# Patient Record
Sex: FEMALE | Race: WHITE | NOT HISPANIC OR LATINO | URBAN - METROPOLITAN AREA
[De-identification: names, ages, dates, MRNs, and addresses within clinical notes are randomized per-mention and may not be internally consistent; named-entity substitution may affect disease eponyms.]

---

## 2020-11-13 LAB — GLUCOSE SERPL-MCNC: 78 MG/DL (ref 70–110)

## 2021-10-21 ENCOUNTER — TELEPHONE (OUTPATIENT)
Dept: HEMATOLOGY/ONCOLOGY | Facility: CLINIC | Age: 31
End: 2021-10-21

## 2021-10-21 DIAGNOSIS — Z52.001 STEM CELL DONOR: Primary | ICD-10-CM

## 2021-11-04 ENCOUNTER — LAB VISIT (OUTPATIENT)
Dept: LAB | Facility: HOSPITAL | Age: 31
End: 2021-11-04
Payer: COMMERCIAL

## 2021-11-04 DIAGNOSIS — Z52.001 STEM CELL DONOR: ICD-10-CM

## 2021-11-10 PROCEDURE — 81373 HLA I TYPING 1 LOCUS LR: CPT | Mod: 59,PO | Performed by: INTERNAL MEDICINE

## 2021-11-10 PROCEDURE — 81376 HLA II TYPING 1 LOCUS LR: CPT | Mod: 59,PO | Performed by: INTERNAL MEDICINE

## 2021-11-10 PROCEDURE — 81373 HLA I TYPING 1 LOCUS LR: CPT | Mod: PO | Performed by: INTERNAL MEDICINE

## 2021-11-10 PROCEDURE — 81376 HLA II TYPING 1 LOCUS LR: CPT | Mod: PO | Performed by: INTERNAL MEDICINE

## 2021-11-12 LAB — HLATY INTERPRETATION: NORMAL

## 2021-11-17 ENCOUNTER — TELEPHONE (OUTPATIENT)
Dept: HEMATOLOGY/ONCOLOGY | Facility: CLINIC | Age: 31
End: 2021-11-17
Payer: COMMERCIAL

## 2021-11-22 LAB
HLA DQA1 1: NORMAL
HLA DQA1 2: NORMAL
HLA DRB4 1: NORMAL
HLA REALTIMEPCR TYPING CLASSI&II INTERPRETATION: NORMAL
HLA-A 1 SERO. EQUIV: 1
HLA-A 1: NORMAL
HLA-A 2 SERO. EQUIV: 23
HLA-A 2: NORMAL
HLA-B 1 SERO. EQUIV: 61
HLA-B 1: NORMAL
HLA-B 2 SERO. EQUIV: 51
HLA-B 2: NORMAL
HLA-BW 1 SERO. EQUIV: 4
HLA-BW 2 SERO. EQUIV: 6
HLA-C 1: NORMAL
HLA-C 2: NORMAL
HLA-CW 1 SERO. EQUIV: 2
HLA-CW 2 SERO. EQUIV: 14
HLA-DPA1 1: NORMAL
HLA-DPA1 2: NORMAL
HLA-DPB1 1: NORMAL
HLA-DPB1 2: NORMAL
HLA-DQ 1 SERO. EQUIV: 7
HLA-DQ 2 SERO. EQUIV: NORMAL
HLA-DQB1 1: NORMAL
HLA-DQB1 2: NORMAL
HLA-DRB1 1 SERO. EQUIV: 11
HLA-DRB1 1: NORMAL
HLA-DRB1 2 SERO. EQUIV: NORMAL
HLA-DRB1 2: NORMAL
HLA-DRB3 1: NORMAL
HLA-DRB3 2: NORMAL
HLA-DRB345 1 SERO. EQUIV: 52
HLA-DRB345 2 SERO. EQUIV: NORMAL
HLA-DRB4 2: NORMAL
HLA-DRB5 1: NORMAL
HLA-DRB5 2: NORMAL
RTPCR TESTING DATE: NORMAL

## 2022-09-15 ENCOUNTER — HISTORICAL (OUTPATIENT)
Dept: ADMINISTRATIVE | Facility: HOSPITAL | Age: 32
End: 2022-09-15
Payer: COMMERCIAL

## 2024-01-06 ENCOUNTER — OFFICE VISIT (OUTPATIENT)
Dept: URGENT CARE | Facility: CLINIC | Age: 34
End: 2024-01-06
Payer: COMMERCIAL

## 2024-01-06 VITALS
BODY MASS INDEX: 30.82 KG/M2 | SYSTOLIC BLOOD PRESSURE: 134 MMHG | WEIGHT: 185 LBS | OXYGEN SATURATION: 97 % | DIASTOLIC BLOOD PRESSURE: 91 MMHG | HEIGHT: 65 IN | TEMPERATURE: 98 F | HEART RATE: 77 BPM | RESPIRATION RATE: 20 BRPM

## 2024-01-06 DIAGNOSIS — L02.91 ABSCESS: Primary | ICD-10-CM

## 2024-01-06 PROCEDURE — 99203 OFFICE O/P NEW LOW 30 MIN: CPT | Mod: ,,, | Performed by: PHYSICIAN ASSISTANT

## 2024-01-06 RX ORDER — VORTIOXETINE 20 MG/1
1 TABLET, FILM COATED ORAL
COMMUNITY

## 2024-01-06 RX ORDER — LISINOPRIL 20 MG/1
20 TABLET ORAL
COMMUNITY

## 2024-01-06 RX ORDER — GENTAMICIN SULFATE 1 MG/G
OINTMENT TOPICAL 3 TIMES DAILY
Qty: 15 G | Refills: 0 | Status: SHIPPED | OUTPATIENT
Start: 2024-01-06

## 2024-01-06 RX ORDER — DROSPIRENONE AND ETHINYL ESTRADIOL 0.02-3(28)
1 KIT ORAL
COMMUNITY
Start: 2023-11-20

## 2024-01-06 RX ORDER — SULFAMETHOXAZOLE AND TRIMETHOPRIM 800; 160 MG/1; MG/1
1 TABLET ORAL 2 TIMES DAILY
Qty: 20 TABLET | Refills: 0 | Status: SHIPPED | OUTPATIENT
Start: 2024-01-06 | End: 2024-01-16

## 2024-01-06 NOTE — PATIENT INSTRUCTIONS
Recommend Bactrim antibiotic coverage for infection concern.  Recommend gentamicin antibiotic ointment application with warm compresses and gentle massage.  Alternate Tylenol and ibuprofen every 4-6 hours if needed for aches pains fever or chills.  Continue monitoring swelling and recommend close follow-up with primary care physician 3-5 days for re-evaluation wound check.  Recommended emergency department evaluation sooner if facial swelling worsens.

## 2024-01-06 NOTE — PROGRESS NOTES
"Subjective:      Patient ID: Court Boyd is a 33 y.o. female.    Vitals:  height is 5' 5" (1.651 m) and weight is 83.9 kg (185 lb). Her temperature is 98 °F (36.7 °C). Her blood pressure is 134/91 (abnormal) and her pulse is 77. Her respiration is 20 and oxygen saturation is 97%.     Chief Complaint: Eye Problem (Pt starting having concerns with right eye on Thursday. With concerns of swelling, redness, and pain.)    HPI  female reports small pustule to right medial nose over the last 48 hours with increasing swelling no active drainage presents to urgent Care for initial evaluation.  Patient reports no visual problems and no contact lens use.   Eye Problem     Additional comments: Pt starting having concerns with right eye on   Thursday. With concerns of swelling, redness, and pain.      Eye Problem   This is a new problem. The problem has been gradually worsening. Pertinent negatives include no eye discharge, eye redness, fever or itching.       Constitution: Negative for chills, fatigue and fever.   HENT:  Negative for sinus pain and sinus pressure.    Eyes:  Positive for eyelid swelling. Negative for eye trauma, foreign body in eye, eye discharge, eye itching and eye redness.   Gastrointestinal: Negative.    Skin:  Positive for erythema.   Allergic/Immunologic: Negative.    Neurological:  Negative for headaches.      Objective:     Physical Exam   Constitutional: She is oriented to person, place, and time. She appears well-developed. She is cooperative.  Non-toxic appearance. She does not appear ill. No distress.      Comments:Awake alert ambulatory female     HENT:   Head: Normocephalic.       Ears:   Right Ear: Hearing and external ear normal.   Left Ear: Hearing and external ear normal.   Nose: Nose normal. No mucosal edema, rhinorrhea, nasal deformity or congestion. No epistaxis. Right sinus exhibits no maxillary sinus tenderness and no frontal sinus tenderness. Left sinus exhibits no maxillary sinus " tenderness and no frontal sinus tenderness.   Mouth/Throat: Uvula is midline, oropharynx is clear and moist and mucous membranes are normal. Mucous membranes are moist. No trismus in the jaw. Normal dentition. No uvula swelling. No posterior oropharyngeal edema.   Eyes: Conjunctivae and lids are normal. Pupils are equal, round, and reactive to light. Right eye exhibits no discharge, no exudate and no hordeolum. No foreign body present in the right eye. Left eye exhibits no discharge, no exudate and no hordeolum. No foreign body present in the left eye. Right conjunctiva is not injected. Left conjunctiva is not injected. No scleral icterus. Extraocular movement intact vision grossly intact   Neck: Trachea normal and phonation normal. Neck supple. No edema present. No erythema present.   Cardiovascular: Normal rate and normal pulses.   Pulmonary/Chest: Effort normal. She has no decreased breath sounds.   Abdominal: Normal appearance.   Musculoskeletal: Normal range of motion.         General: No deformity. Normal range of motion.   Neurological: no focal deficit. She is alert and oriented to person, place, and time. She displays no weakness. No cranial nerve deficit. She exhibits normal muscle tone.   Skin: Skin is warm, dry, intact, not diaphoretic and not pale. erythema   Psychiatric: Her speech is normal.   Nursing note and vitals reviewed.      Assessment:     1. Abscess        Plan:   Recommend Bactrim antibiotic coverage for infection concern.  Recommend gentamicin antibiotic ointment application with warm compresses and gentle massage.  Alternate Tylenol and ibuprofen every 4-6 hours if needed for aches pains fever or chills.  Continue monitoring swelling and recommend close follow-up with primary care physician 3-5 days for re-evaluation wound check.  Recommended emergency department evaluation sooner if facial swelling worsens.    Abscess    Other orders  -     sulfamethoxazole-trimethoprim 800-160mg (BACTRIM  DS) 800-160 mg Tab; Take 1 tablet by mouth 2 (two) times daily. for 10 days  Dispense: 20 tablet; Refill: 0  -     gentamicin (GARAMYCIN) 0.1 % ointment; Apply topically 3 (three) times daily.  Dispense: 15 g; Refill: 0

## 2024-05-14 ENCOUNTER — OFFICE VISIT (OUTPATIENT)
Dept: URGENT CARE | Facility: CLINIC | Age: 34
End: 2024-05-14
Payer: COMMERCIAL

## 2024-05-14 VITALS
OXYGEN SATURATION: 97 % | SYSTOLIC BLOOD PRESSURE: 126 MMHG | HEIGHT: 65 IN | TEMPERATURE: 98 F | DIASTOLIC BLOOD PRESSURE: 87 MMHG | RESPIRATION RATE: 20 BRPM | WEIGHT: 185 LBS | HEART RATE: 82 BPM | BODY MASS INDEX: 30.82 KG/M2

## 2024-05-14 DIAGNOSIS — M79.671 RIGHT FOOT PAIN: Primary | ICD-10-CM

## 2024-05-14 PROCEDURE — 99212 OFFICE O/P EST SF 10 MIN: CPT | Mod: ,,, | Performed by: PHYSICIAN ASSISTANT

## 2024-05-14 RX ORDER — BUSPIRONE HYDROCHLORIDE 10 MG/1
TABLET ORAL
COMMUNITY

## 2024-05-14 RX ORDER — LISINOPRIL AND HYDROCHLOROTHIAZIDE 12.5; 2 MG/1; MG/1
1 TABLET ORAL
COMMUNITY

## 2024-05-14 NOTE — PATIENT INSTRUCTIONS
We will call you with the radiologist's foot x-ray final report as soon as available.    High concern for acute blunt foot contusion.  Recommend continue elevate and ice foot rotating Tylenol and ibuprofen as needed for mild-to-moderate pain and inflammation.  May add personal leftover tramadol if needed for breakthrough pain.  Recommend foot elevation when not walking to help reduce swelling.  Recommend follow-up with orthopedist in 1-2 weeks for re-evaluation if not improving.

## 2024-09-10 ENCOUNTER — OFFICE VISIT (OUTPATIENT)
Dept: URGENT CARE | Facility: CLINIC | Age: 34
End: 2024-09-10
Payer: COMMERCIAL

## 2024-09-10 VITALS
WEIGHT: 190 LBS | RESPIRATION RATE: 20 BRPM | HEIGHT: 66 IN | BODY MASS INDEX: 30.53 KG/M2 | OXYGEN SATURATION: 98 % | HEART RATE: 82 BPM | TEMPERATURE: 98 F | DIASTOLIC BLOOD PRESSURE: 81 MMHG | SYSTOLIC BLOOD PRESSURE: 116 MMHG

## 2024-09-10 DIAGNOSIS — J02.9 PHARYNGITIS, UNSPECIFIED ETIOLOGY: ICD-10-CM

## 2024-09-10 DIAGNOSIS — J06.9 VIRAL URI WITH COUGH: Primary | ICD-10-CM

## 2024-09-10 LAB
CTP QC/QA: YES
CTP QC/QA: YES
MOLECULAR STREP A: NEGATIVE
SARS-COV-2 AG RESP QL IA.RAPID: NEGATIVE

## 2024-09-10 PROCEDURE — 96372 THER/PROPH/DIAG INJ SC/IM: CPT | Mod: ,,,

## 2024-09-10 PROCEDURE — 87811 SARS-COV-2 COVID19 W/OPTIC: CPT | Mod: QW,,,

## 2024-09-10 PROCEDURE — 87651 STREP A DNA AMP PROBE: CPT | Mod: QW,,,

## 2024-09-10 PROCEDURE — 99213 OFFICE O/P EST LOW 20 MIN: CPT | Mod: 25,,,

## 2024-09-10 RX ORDER — DROSPIRENONE AND ETHINYL ESTRADIOL 0.02-3(28)
1 KIT ORAL
COMMUNITY
Start: 2024-08-17

## 2024-09-10 RX ORDER — BETAMETHASONE SODIUM PHOSPHATE AND BETAMETHASONE ACETATE 3; 3 MG/ML; MG/ML
9 INJECTION, SUSPENSION INTRA-ARTICULAR; INTRALESIONAL; INTRAMUSCULAR; SOFT TISSUE
Status: COMPLETED | OUTPATIENT
Start: 2024-09-10 | End: 2024-09-10

## 2024-09-10 RX ORDER — PROMETHAZINE HYDROCHLORIDE AND DEXTROMETHORPHAN HYDROBROMIDE 6.25; 15 MG/5ML; MG/5ML
5 SYRUP ORAL EVERY 6 HOURS PRN
Qty: 118 ML | Refills: 0 | Status: SHIPPED | OUTPATIENT
Start: 2024-09-10 | End: 2024-09-20

## 2024-09-10 RX ADMIN — BETAMETHASONE SODIUM PHOSPHATE AND BETAMETHASONE ACETATE 9 MG: 3; 3 INJECTION, SUSPENSION INTRA-ARTICULAR; INTRALESIONAL; INTRAMUSCULAR; SOFT TISSUE at 09:09

## 2024-09-10 NOTE — PATIENT INSTRUCTIONS
Negative COVID and strep swab  Drink plenty of fluids. Get plenty of rest.   Claritin, Zyrtec or other over-the-counter antihistamine for runny nose, postnasal drip, nasal congestion.  Nasal spray such as Nasacort or Flonase for congestion.  Over-the-counter cough medication with expectorant such as guaifenesin for daytime cough.  Prescription cough syrup nightly as it may make you drowsy, do not drive while taking.  Prescription cough syrup has antihistamine present, do not combine with other antihistamines or cough medications.  Over-the-counter decongestants such as Sudafed, phenylephrine or pseudoephedrine.  Avoid these if you have a history of high blood pressure.  Warm saltwater gargles for sore throat.  Warm water with honey to help coat the throat.  Throat lozenges.  Chloraseptic spray for worsening sore throat.  Tylenol or ibuprofen as needed for sore throat and fever.  May alternate every 3 hours.    Call or return to clinic as needed   Go to the ER with any significant change or worsening of symptoms.   Follow up with your primary care doctor.

## 2024-09-10 NOTE — PROGRESS NOTES
"Subjective:      Patient ID: Court Boyd is a 34 y.o. female.    Vitals:  height is 5' 6" (1.676 m) and weight is 86.2 kg (190 lb). Her temperature is 98 °F (36.7 °C). Her blood pressure is 116/81 and her pulse is 82. Her respiration is 20 and oxygen saturation is 98%.     Chief Complaint: Sore Throat     Patient is a 34 y.o. female who presents to urgent care with complaints of sore throat, body aches, cough, mucus, headache, chills, and fatigue x3 days. Pts symptoms have worsened since onset. Alleviating factors include Tylenol Sinus Severe with no relief. Patient denies vomiting.     ROS   Objective:     Physical Exam   Constitutional: She is oriented to person, place, and time. She appears well-developed. She is cooperative.  Non-toxic appearance. She does not appear ill. No distress.   HENT:   Head: Normocephalic and atraumatic.   Ears:   Right Ear: Hearing, tympanic membrane, external ear and ear canal normal.   Left Ear: Hearing, tympanic membrane, external ear and ear canal normal.   Nose: Nose normal. No mucosal edema, rhinorrhea or nasal deformity. No epistaxis. Right sinus exhibits no maxillary sinus tenderness and no frontal sinus tenderness. Left sinus exhibits no maxillary sinus tenderness and no frontal sinus tenderness.   Mouth/Throat: Uvula is midline and mucous membranes are normal. Mucous membranes are moist. No trismus in the jaw. Normal dentition. No uvula swelling. Posterior oropharyngeal erythema present. No oropharyngeal exudate or posterior oropharyngeal edema.   Eyes: Conjunctivae and lids are normal. No scleral icterus.   Neck: Trachea normal and phonation normal. Neck supple. No edema present. No erythema present. No neck rigidity present.   Cardiovascular: Normal rate, regular rhythm, normal heart sounds and normal pulses.   Pulmonary/Chest: Effort normal and breath sounds normal. No respiratory distress. She has no decreased breath sounds. She has no rhonchi.   Abdominal: Normal " appearance.   Musculoskeletal: Normal range of motion.         General: No deformity. Normal range of motion.   Lymphadenopathy:     She has no cervical adenopathy.   Neurological: She is alert and oriented to person, place, and time. She exhibits normal muscle tone. Coordination normal.   Skin: Skin is warm, dry, intact, not diaphoretic and not pale.   Psychiatric: Her speech is normal and behavior is normal. Judgment and thought content normal.   Nursing note and vitals reviewed.      Assessment:     1. Viral URI with cough    2. Pharyngitis, unspecified etiology        Plan:       Viral URI with cough  -     POCT Strep A, Molecular  -     SARS Coronavirus 2 Antigen, POCT Manual Read  -     promethazine-dextromethorphan (PROMETHAZINE-DM) 6.25-15 mg/5 mL Syrp; Take 5 mLs by mouth every 6 (six) hours as needed.  Dispense: 118 mL; Refill: 0  -     betamethasone acetate-betamethasone sodium phosphate injection 9 mg    Pharyngitis, unspecified etiology      Negative COVID and strep swab  Drink plenty of fluids. Get plenty of rest.   Claritin, Zyrtec or other over-the-counter antihistamine for runny nose, postnasal drip, nasal congestion.  Nasal spray such as Nasacort or Flonase for congestion.  Over-the-counter cough medication with expectorant such as guaifenesin for daytime cough.  Prescription cough syrup nightly as it may make you drowsy, do not drive while taking.  Prescription cough syrup has antihistamine present, do not combine with other antihistamines or cough medications.  Over-the-counter decongestants such as Sudafed, phenylephrine or pseudoephedrine.  Avoid these if you have a history of high blood pressure.  Warm saltwater gargles for sore throat.  Warm water with honey to help coat the throat.  Throat lozenges.  Chloraseptic spray for worsening sore throat.  Tylenol or ibuprofen as needed for sore throat and fever.  May alternate every 3 hours.    Call or return to clinic as needed   Go to the ER with  any significant change or worsening of symptoms.   Follow up with your primary care doctor.

## 2024-10-17 ENCOUNTER — OFFICE VISIT (OUTPATIENT)
Dept: URGENT CARE | Facility: CLINIC | Age: 34
End: 2024-10-17
Payer: COMMERCIAL

## 2024-10-17 VITALS
HEIGHT: 66 IN | SYSTOLIC BLOOD PRESSURE: 137 MMHG | WEIGHT: 197.31 LBS | TEMPERATURE: 99 F | DIASTOLIC BLOOD PRESSURE: 83 MMHG | RESPIRATION RATE: 18 BRPM | OXYGEN SATURATION: 99 % | BODY MASS INDEX: 31.71 KG/M2 | HEART RATE: 82 BPM

## 2024-10-17 DIAGNOSIS — R05.9 COUGH, UNSPECIFIED TYPE: Primary | ICD-10-CM

## 2024-10-17 PROCEDURE — 99213 OFFICE O/P EST LOW 20 MIN: CPT | Mod: ,,,

## 2024-10-17 RX ORDER — AZITHROMYCIN 250 MG/1
TABLET, FILM COATED ORAL
Qty: 6 TABLET | Refills: 0 | Status: SHIPPED | OUTPATIENT
Start: 2024-10-17 | End: 2024-10-22

## 2024-10-17 RX ORDER — CODEINE PHOSPHATE AND GUAIFENESIN 10; 100 MG/5ML; MG/5ML
5 SOLUTION ORAL EVERY 8 HOURS PRN
Qty: 118 ML | Refills: 0 | Status: SHIPPED | OUTPATIENT
Start: 2024-10-17 | End: 2024-10-27

## 2024-10-17 NOTE — PATIENT INSTRUCTIONS
Take antibiotics until complete.  This will cover for walking pneumonia exposure.   Prescription cough syrup may make you drowsy, do not drive while taking.  Take nightly.     Drink plenty of fluids. Get plenty of rest.   Claritin, Zyrtec or other over-the-counter antihistamine for runny nose, postnasal drip, nasal congestion.  Nasal spray such as Nasacort or Flonase for congestion.  Over-the-counter cough medication with expectorant such as guaifenesin for daytime cough.  Prescription cough syrup nightly as it may make you drowsy, do not drive while taking.  Prescription cough syrup has codeine in it, do not combine with other antihistamines or cough medications.  Over-the-counter decongestants such as Sudafed, phenylephrine or pseudoephedrine.  Avoid these if you have a history of high blood pressure.  Warm saltwater gargles for sore throat.  Warm water with honey to help coat the throat.  Throat lozenges.  Chloraseptic spray for worsening sore throat.  Tylenol or ibuprofen as needed for sore throat and fever.  May alternate every 3 hours.    Call or return to clinic as needed   Go to the ER with any significant change or worsening of symptoms.   Follow up with your primary care doctor.

## 2024-10-17 NOTE — PROGRESS NOTES
"Subjective:      Patient ID: Court Boyd is a 34 y.o. female.    Vitals:  height is 5' 6" (1.676 m) and weight is 89.5 kg (197 lb 4.8 oz). Her oral temperature is 98.8 °F (37.1 °C). Her blood pressure is 137/83 and her pulse is 82. Her respiration is 18 and oxygen saturation is 99%.     Chief Complaint: Cough     Patient is a 34 y.o. female who presents to urgent care with complaints of painful cough, hoarseness started x6 days ago diarrhea, intermittent abdominal pain x2 days. Patient believes the intermittent abdominal pain and diarrhea are from her gallbladder, has recently had a gallbladder "attack" is awaiting surgical removal.. Alleviating factors include promethazine DM with no relief. Patient denies known fever however she does report associated chills, night sweats at night.  Denies, congestion, sore throat, sinus pressure, nausea, vomiting, labored breathing.  She does report cough is worsening, persistent.  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      ROS   Objective:     Physical Exam   Constitutional: She is oriented to person, place, and time. She appears well-developed. She is cooperative.  Non-toxic appearance. She does not appear ill. No distress.   HENT:   Head: Normocephalic and atraumatic.   Ears:   Right Ear: Hearing, tympanic membrane, external ear and ear canal normal.   Left Ear: Hearing, tympanic membrane, external ear and ear canal normal.   Nose: Nose normal. No mucosal edema, rhinorrhea or nasal deformity. No epistaxis. Right sinus exhibits no maxillary sinus tenderness and no frontal sinus tenderness. Left sinus exhibits no maxillary sinus tenderness and no frontal sinus tenderness.   Mouth/Throat: Uvula is midline, oropharynx is clear and moist and mucous membranes are normal. Mucous membranes are moist. No trismus in the jaw. Normal dentition. No uvula swelling. " No oropharyngeal exudate, posterior oropharyngeal edema or posterior oropharyngeal erythema.   Eyes: Conjunctivae and lids are normal. No scleral icterus.   Neck: Trachea normal and phonation normal. Neck supple. No edema present. No erythema present. No neck rigidity present.   Cardiovascular: Normal rate, regular rhythm, normal heart sounds and normal pulses.   Pulmonary/Chest: Effort normal and breath sounds normal. No respiratory distress. She has no decreased breath sounds. She has no wheezes. She has no rhonchi. She has no rales.   Abdominal: Normal appearance.   Musculoskeletal: Normal range of motion.         General: No deformity. Normal range of motion.   Neurological: She is alert and oriented to person, place, and time. She exhibits normal muscle tone. Coordination normal.   Skin: Skin is warm, dry, intact, not diaphoretic and not pale.   Psychiatric: Her speech is normal and behavior is normal. Judgment and thought content normal.   Nursing note and vitals reviewed.      Assessment:     1. Cough, unspecified type        Plan:       Cough, unspecified type  -     azithromycin (Z-RAUL) 250 MG tablet; Take 2 tablets by mouth on day 1; Take 1 tablet by mouth on days 2-5  Dispense: 6 tablet; Refill: 0  -     guaiFENesin-codeine 100-10 mg/5 ml (TUSSI-ORGANIDIN NR)  mg/5 mL syrup; Take 5 mLs by mouth every 8 (eight) hours as needed for Cough.  Dispense: 118 mL; Refill: 0        Due to mycoplasma exposure and symptoms will cover with azithromycin.    .  Take antibiotics until complete.  This will cover for walking pneumonia exposure.   Prescription cough syrup may make you drowsy, do not drive while taking.  Take nightly.     Drink plenty of fluids. Get plenty of rest.   Claritin, Zyrtec or other over-the-counter antihistamine for runny nose, postnasal drip, nasal congestion.  Nasal spray such as Nasacort or Flonase for congestion.  Over-the-counter cough medication with expectorant such as guaifenesin for  daytime cough.  Prescription cough syrup nightly as it may make you drowsy, do not drive while taking.  Prescription cough syrup has codeine in it, do not combine with other antihistamines or cough medications.  Over-the-counter decongestants such as Sudafed, phenylephrine or pseudoephedrine.  Avoid these if you have a history of high blood pressure.  Warm saltwater gargles for sore throat.  Warm water with honey to help coat the throat.  Throat lozenges.  Chloraseptic spray for worsening sore throat.  Tylenol or ibuprofen as needed for sore throat and fever.  May alternate every 3 hours.    Call or return to clinic as needed   Go to the ER with any significant change or worsening of symptoms.   Follow up with your primary care doctor.
